# Patient Record
Sex: MALE | Race: BLACK OR AFRICAN AMERICAN | NOT HISPANIC OR LATINO | Employment: FULL TIME | URBAN - METROPOLITAN AREA
[De-identification: names, ages, dates, MRNs, and addresses within clinical notes are randomized per-mention and may not be internally consistent; named-entity substitution may affect disease eponyms.]

---

## 2024-06-10 ENCOUNTER — HOSPITAL ENCOUNTER (EMERGENCY)
Facility: CLINIC | Age: 34
Discharge: HOME | End: 2024-06-10
Attending: EMERGENCY MEDICINE | Admitting: EMERGENCY MEDICINE
Payer: OTHER MISCELLANEOUS

## 2024-06-10 VITALS
OXYGEN SATURATION: 99 % | SYSTOLIC BLOOD PRESSURE: 130 MMHG | HEART RATE: 65 BPM | TEMPERATURE: 97.8 F | RESPIRATION RATE: 18 BRPM | DIASTOLIC BLOOD PRESSURE: 65 MMHG

## 2024-06-10 DIAGNOSIS — S61.012A LACERATION OF LEFT THUMB WITHOUT FOREIGN BODY WITHOUT DAMAGE TO NAIL, INITIAL ENCOUNTER: ICD-10-CM

## 2024-06-10 PROCEDURE — 99282 EMERGENCY DEPT VISIT SF MDM: CPT

## 2024-06-10 ASSESSMENT — COLUMBIA-SUICIDE SEVERITY RATING SCALE - C-SSRS
2. HAVE YOU ACTUALLY HAD ANY THOUGHTS OF KILLING YOURSELF IN THE PAST MONTH?: NO
6. HAVE YOU EVER DONE ANYTHING, STARTED TO DO ANYTHING, OR PREPARED TO DO ANYTHING TO END YOUR LIFE?: NO
1. IN THE PAST MONTH, HAVE YOU WISHED YOU WERE DEAD OR WISHED YOU COULD GO TO SLEEP AND NOT WAKE UP?: NO

## 2024-06-10 ASSESSMENT — ACTIVITIES OF DAILY LIVING (ADL): ADLS_ACUITY_SCORE: 33

## 2024-06-10 NOTE — ED TRIAGE NOTES
Pt to ER with c/o lac to left thumb happened at work cut on a piece of metal, unknown TET status pt with avulsion to top of thumb bleeding controlled at this time

## 2024-06-10 NOTE — ED PROVIDER NOTES
Emergency Department Note      History of Present Illness     Chief Complaint  Laceration    HPI  Prakash Muse is a 33 year old male who presents to the ED for a laceration to his L thumb. The patient was at work and moving boxes when he cut his finger on a steel cage. He is unsure of his tetanus status at this time.     Independent Historian  None    Review of External Notes  I reviewed Care Everywhere and updated Epic.   Last tetanus per chart review in 2021    Past Medical History   Medical History and Problem List  No past medical history on file.    Medications  ADVAIR DISKUS 100-50 MCG/DOSE IN MISC  PROAIR  (90 BASE) MCG/ACT IN AERS      Surgical History   Past Surgical History:   Procedure Laterality Date     TONSILLECTOMY & ADENOIDECTOMY       Physical Exam   Patient Vitals for the past 24 hrs:   BP Temp Temp src Pulse Resp SpO2   06/10/24 0454 130/65 97.8  F (36.6  C) Temporal 65 18 99 %     Physical Exam  Nursing note and vitals reviewed.  Constitutional: Cooperative.   Pulmonary/Chest: Effort normal  Musculoskeletal: Normal range of motion. Dorsal avulsion laceration to the IP joint of the left thumb.   Neurological: Alert.   Skin: Skin is warm and dry.  See above  Psychiatric: Normal mood and affect.      Diagnostics   Lab Results   Labs Ordered and Resulted from Time of ED Arrival to Time of ED Departure - No data to display    Imaging  No orders to display     Independent Interpretation  None    ED Course    Medications Administered  Medications - No data to display    Discussion of Management  None    Social Determinants of Health adding to complexity of care  None    ED Course  ED Course as of 06/10/24 0507   Mon Norbert 10, 2024   0500 I obtained the history and examined the patient as above.      Medical Decision Making / Diagnosis     MDM  Prakash Muse is a 33 year old male who presents with avulsion fracture to the knuckle of the left thumb.  The wound is hemostatic on arrival to the ED  after the patient had difficult time controlling bleeding at home.  The wound was cleaned and covered with Gelfoam and sterile dressings.  Tetanus was confirmed to be updated.  Wound care instructions provided at discharge    Disposition  The patient was discharged.     ICD-10 Codes:    ICD-10-CM    1. Laceration of left thumb without foreign body without damage to nail, initial encounter  S61.012A            Scribe Disclosure:  I, Helen Guevara, am serving as a scribe at 5:07 AM on 6/10/2024 to document services personally performed by Lul Bailey MD based on my observations and the provider's statements to me.        Lul Bailey MD  06/10/24 8888